# Patient Record
Sex: FEMALE | Race: WHITE | NOT HISPANIC OR LATINO | Employment: OTHER | RURAL
[De-identification: names, ages, dates, MRNs, and addresses within clinical notes are randomized per-mention and may not be internally consistent; named-entity substitution may affect disease eponyms.]

---

## 2019-02-08 ENCOUNTER — HISTORICAL (OUTPATIENT)
Dept: ADMINISTRATIVE | Facility: HOSPITAL | Age: 69
End: 2019-02-08

## 2019-02-12 LAB
LAB AP DIAGNOSIS - HISTORICAL: NORMAL
LAB AP GROSS PATHOLOGY - HISTORICAL: NORMAL
LAB AP SPECIMEN SUBMITTED - HISTORICAL: NORMAL

## 2021-11-03 ENCOUNTER — HOSPITAL ENCOUNTER (OUTPATIENT)
Dept: RADIOLOGY | Facility: HOSPITAL | Age: 71
Discharge: HOME | End: 2021-11-03
Attending: ORTHOPAEDIC SURGERY
Payer: MEDICARE

## 2021-11-03 DIAGNOSIS — M25.511 RIGHT SHOULDER PAIN, UNSPECIFIED CHRONICITY: ICD-10-CM

## 2021-11-03 PROBLEM — M75.41 SHOULDER IMPINGEMENT SYNDROME, RIGHT: Status: ACTIVE | Noted: 2021-11-03

## 2021-11-03 PROCEDURE — 73030 X-RAY EXAM OF SHOULDER: CPT | Mod: TC,RT

## 2025-04-08 ENCOUNTER — OFFICE VISIT (OUTPATIENT)
Dept: FAMILY MEDICINE | Facility: CLINIC | Age: 75
End: 2025-04-08
Payer: MEDICARE

## 2025-04-08 VITALS
WEIGHT: 124.63 LBS | SYSTOLIC BLOOD PRESSURE: 127 MMHG | HEIGHT: 67 IN | BODY MASS INDEX: 19.56 KG/M2 | OXYGEN SATURATION: 99 % | HEART RATE: 83 BPM | RESPIRATION RATE: 20 BRPM | TEMPERATURE: 98 F | DIASTOLIC BLOOD PRESSURE: 70 MMHG

## 2025-04-08 DIAGNOSIS — J32.9 SINUSITIS, UNSPECIFIED CHRONICITY, UNSPECIFIED LOCATION: Primary | ICD-10-CM

## 2025-04-08 PROCEDURE — 96372 THER/PROPH/DIAG INJ SC/IM: CPT | Mod: ,,, | Performed by: NURSE PRACTITIONER

## 2025-04-08 PROCEDURE — 99203 OFFICE O/P NEW LOW 30 MIN: CPT | Mod: 25,,, | Performed by: NURSE PRACTITIONER

## 2025-04-08 RX ORDER — AMOXICILLIN 500 MG/1
500 TABLET, FILM COATED ORAL EVERY 12 HOURS
Qty: 20 TABLET | Refills: 0 | Status: SHIPPED | OUTPATIENT
Start: 2025-04-08 | End: 2025-04-18

## 2025-04-08 RX ORDER — BENZONATATE 100 MG/1
100 CAPSULE ORAL 3 TIMES DAILY PRN
Qty: 30 CAPSULE | Refills: 0 | Status: SHIPPED | OUTPATIENT
Start: 2025-04-08

## 2025-04-08 RX ORDER — DEXAMETHASONE SODIUM PHOSPHATE 4 MG/ML
8 INJECTION, SOLUTION INTRA-ARTICULAR; INTRALESIONAL; INTRAMUSCULAR; INTRAVENOUS; SOFT TISSUE
Status: COMPLETED | OUTPATIENT
Start: 2025-04-08 | End: 2025-04-08

## 2025-04-08 RX ORDER — CEFTRIAXONE 1 G/1
1 INJECTION, POWDER, FOR SOLUTION INTRAMUSCULAR; INTRAVENOUS
Status: COMPLETED | OUTPATIENT
Start: 2025-04-08 | End: 2025-04-08

## 2025-04-08 RX ADMIN — CEFTRIAXONE 1 G: 1 INJECTION, POWDER, FOR SOLUTION INTRAMUSCULAR; INTRAVENOUS at 03:04

## 2025-04-08 RX ADMIN — DEXAMETHASONE SODIUM PHOSPHATE 8 MG: 4 INJECTION, SOLUTION INTRA-ARTICULAR; INTRALESIONAL; INTRAMUSCULAR; INTRAVENOUS; SOFT TISSUE at 03:04

## 2025-04-09 NOTE — PROGRESS NOTES
Lucy Sultana NP   [address] [place] [address]     PATIENT NAME: Alayna Ashley  : 1950  DATE: 25  MRN: [id number]      Billing Provider: Lucy Sultana NP  Level of Service:   Patient PCP Information       Provider PCP Type    Primary Doctor No General            Reason for Visit / Chief Complaint: Cough, Nasal Congestion, and Hoarse (Symptoms x1 week)       Update PCP  Update Chief Complaint         History of Present Illness / Problem Focused Workflow     Alayna Ashley presents to the clinic with Cough, Nasal Congestion, and Hoarse (Symptoms x1 week)     History of Present Illness    HPI:  Patient reports hoarseness, coughing, and congestion with recent onset. Her voice is notably hoarse, and she has significant difficulty engaging in conversation with her visiting children. She has significant mucus production in the mornings. Her ears have started popping again, similar to how they did after having COVID. She reports mild burning sensation in the nostrils. Her  had been sick with similar symptoms almost 2 weeks ago, initially thought to be due to pollen exposure from fishing. He was treated by Dr. Jha with an antibiotic and a shot, which improved his condition. She mentions taking some Tessalon Perles, which were previously prescribed to her daughter when she had COVID.    She denies fever and significant sinus pressure.    MEDICATIONS:  Patient is on diabetes medication and receives a weekly injection for diabetes management.    MEDICAL HISTORY:  She has a history of diabetes and COVID-19.    TEST RESULTS:  Patient's blood sugar were recently described as excellent by Dr. Dow and very good by Dr. Mayorga.    ALLERGIES:  Patient has no known allergies.      ROS:  General: -fever, -chills, -fatigue, -weight gain, -weight loss  Eyes: -vision changes, -redness, -discharge  ENT: -ear pain, +nasal congestion, -sore throat, +hoarseness, +post nasal drip, +ear  pressure  Cardiovascular: -chest pain, -palpitations, -lower extremity edema  Respiratory: +cough, -shortness of breath  Gastrointestinal: -abdominal pain, -nausea, -vomiting, -diarrhea, -constipation, -blood in stool  Genitourinary: -dysuria, -hematuria, -frequency  Musculoskeletal: -joint pain, -muscle pain  Skin: -rash, -lesion  Neurological: -headache, -dizziness, -numbness, -tingling  Psychiatric: -anxiety, -depression, -sleep difficulty                Medical / Social / Family History     Past Medical History:   Diagnosis Date    Carpal tunnel syndrome     Diabetes mellitus, type 2     Hypertension        Past Surgical History:   Procedure Laterality Date    BACK SURGERY      CARPAL TUNNEL RELEASE Bilateral     CHOLECYSTECTOMY      HAND SURGERY Right     Dupuytren's contracture release    HYSTERECTOMY         Social History  Ms.  reports that she has never smoked. She has never used smokeless tobacco. She reports that she does not drink alcohol and does not use drugs.    Family History  Ms.'s family history includes Cancer in her father and sister; Diabetes in her mother; Stroke in her mother.    Medications and Allergies     Medications  Outpatient Medications Marked as Taking for the 4/8/25 encounter (Office Visit) with Lucy Sultana NP   Medication Sig Dispense Refill    amitriptyline (ELAVIL) 10 MG tablet       aspirin (ECOTRIN) 81 MG EC tablet Take 81 mg by mouth once daily.      atorvastatin (LIPITOR) 20 MG tablet       carvediloL (COREG) 25 MG tablet       clonazePAM (KLONOPIN) 0.5 MG tablet       esomeprazole (NEXIUM) 40 MG capsule       glyBURIDE (DIABETA) 5 MG tablet       metFORMIN (GLUCOPHAGE) 500 MG tablet       potassium chloride (MICRO-K) 8 mEq CpSR       telmisartan (MICARDIS) 80 MG Tab       tiZANidine (ZANAFLEX) 4 MG tablet        Current Facility-Administered Medications for the 4/8/25 encounter (Office Visit) with Lucy Sultana NP   Medication Dose Route Frequency Provider Last Rate  "Last Admin    [COMPLETED] cefTRIAXone injection 1 g  1 g Intramuscular 1 time in Clinic/HOD Lucy Sultana NP   1 g at 04/08/25 1533    [COMPLETED] dexAMETHasone injection 8 mg  8 mg Intramuscular 1 time in Clinic/HOD Lucy Sultana NP   8 mg at 04/08/25 1533       Allergies  Review of patient's allergies indicates:  No Known Allergies    Physical Examination   /70 (BP Location: Right arm, Patient Position: Sitting)   Pulse 83   Temp 98 °F (36.7 °C) (Oral)   Resp 20   Ht 5' 7" (1.702 m)   Wt 56.5 kg (124 lb 9.6 oz)   SpO2 99%   BMI 19.52 kg/m²    Physical Exam    General: No acute distress. Well-developed. Well-nourished.  Eyes: EOMI. Sclerae anicteric.  HENT: Normocephalic. Atraumatic. Nares patent. Moist oral mucosa. Posterior pharynx injected with post nasal drip. Voice hoarse. Nasal congestion present.  Ears: Bilateral TM dull and full without erythema. Bilateral EACs clear.  Cardiovascular: Regular rate. Regular rhythm. No murmurs. No rubs. No gallops. Normal S1, S2.  Respiratory: Normal respiratory effort. Clear to auscultation bilaterally. No rales. No rhonchi. No wheezing.  Musculoskeletal: No  obvious deformity.  Extremities: No lower extremity edema.  Neurological: Alert & oriented x3. No slurred speech. Normal gait.  Psychiatric: Normal mood. Normal affect. Good insight. Good judgment.  Skin: Warm. Dry. No rash.           Assessment and Plan (including Health Maintenance)      Problem List  Smart Sets  Document Outside HM   :    Assessment & Plan    IMPRESSION:  - Assessed symptoms as consistent with sinus infection or upper respiratory infection.  - Determined treatment approach based on duration of symptoms.  - Diabetes management improving, noting recent positive feedback from other physicians.    DIABETES MELLITUS:  - Confirmed patient's adherence to diabetes medication, including regular use of weekly injections.  - Recent check-ups with Dr. Dow and Dr. Mayorga showed excellent to " very good results.  - Discussed patient's blood sugar and their consideration of switching primary care for diabetes management.  - Verified [facility] is being used for diabetes medication.    ACUTE NASOPHARYNGITIS (COMMON COLD) / SINUS INFECTION:  - Patient reports coughing, congestion, voice issues, and burning in nostrils.  - Examination revealed posterior pharynx injected with post-nasal drip and nasal congestion.  - Assessed as sinus infection or upper respiratory infection.  - Treatment plan includes: 1.  - Oral antibiotic to be filled at [facility], starting tomorrow after today's injections. 2.  - Administered steroid injections. 3.  - Prescribed Tessalon Perles for cough management.               Health Maintenance Due   Topic Date Due    Hepatitis C Screening  Never done    Lipid Panel  Never done    TETANUS VACCINE  Never done    DEXA Scan  Never done    Shingles Vaccine (1 of 2) Never done    Pneumococcal Vaccines (Age 50+) (1 of 1 - PCV) Never done    RSV Vaccine (Age 60+ and Pregnant patients) (1 - 1-dose 75+ series) Never done    Influenza Vaccine (1) Never done    COVID-19 Vaccine (1 - 2024-25 season) Never done       Problem List Items Addressed This Visit    None  Visit Diagnoses         Sinusitis, unspecified chronicity, unspecified location    -  Primary    Relevant Medications    dexAMETHasone injection 8 mg (Completed)    cefTRIAXone injection 1 g (Completed)    amoxicillin (AMOXIL) 500 MG Tab    benzonatate (TESSALON) 100 MG capsule            The patient has no Health Maintenance topics of status Not Due    No future appointments.         Signature:  Lucy Sultana NP      [address]  [place] [address]    Date of encounter: 4/8/25